# Patient Record
Sex: FEMALE | Race: AMERICAN INDIAN OR ALASKA NATIVE
[De-identification: names, ages, dates, MRNs, and addresses within clinical notes are randomized per-mention and may not be internally consistent; named-entity substitution may affect disease eponyms.]

---

## 2017-04-07 ENCOUNTER — HOSPITAL ENCOUNTER (OUTPATIENT)
Dept: HOSPITAL 5 - CARD | Age: 67
Discharge: HOME | End: 2017-04-07
Payer: MEDICARE

## 2017-04-07 DIAGNOSIS — Z02.89: Primary | ICD-10-CM

## 2017-04-07 DIAGNOSIS — I10: ICD-10-CM

## 2017-04-07 PROCEDURE — 93010 ELECTROCARDIOGRAM REPORT: CPT

## 2017-04-07 PROCEDURE — 93005 ELECTROCARDIOGRAM TRACING: CPT

## 2017-06-28 ENCOUNTER — HOSPITAL ENCOUNTER (OUTPATIENT)
Dept: HOSPITAL 5 - MAMMO | Age: 67
Discharge: HOME | End: 2017-06-28
Payer: OTHER GOVERNMENT

## 2017-06-28 DIAGNOSIS — Z12.31: Primary | ICD-10-CM

## 2017-06-28 PROCEDURE — 77067 SCR MAMMO BI INCL CAD: CPT

## 2017-06-28 PROCEDURE — G0202 SCR MAMMO BI INCL CAD: HCPCS

## 2017-06-28 NOTE — MAMMOGRAPHY REPORT
Bilateral digital screening mammogram with CAD.



Findings: There is heterogeneous density of the fibroglandular tissue. 

In the right breast at the 12:00 position, there is a vague  asymmetry 

which needs further evaluation. No architectural distortion or 

suspicious calcifications.



Impression: Right Parenchymal asymmetry.



BI-RADS code: 0.



Recommendation: Spot compression images, 90 degree view, and ultrasound 

if needed.

## 2017-07-14 ENCOUNTER — HOSPITAL ENCOUNTER (OUTPATIENT)
Dept: HOSPITAL 5 - MAMMO | Age: 67
Discharge: HOME | End: 2017-07-14
Payer: MEDICARE

## 2017-07-14 DIAGNOSIS — R92.8: Primary | ICD-10-CM

## 2017-07-14 NOTE — MAMMOGRAPHY REPORT
Spot compression magnification of this finding density upper inner 

right breast:



Findings:



There is complete effacement noted of the density seen on the MLO view 

and CC view with spot compression and magnification views. No 

microcalcification or masses seen.



Impression:



Benign findings. Annual followup mammogram recommended. BI-RADS 

CATEGORY:  2 = Benign



ACR BI-RADS MAMMOGRAPHIC CODES:

0 = Needs additional imaging evaluation; 1 = Negative; 2 = Benign; 3 = 

Probably benign; 4 = Suspicious; 5 = Malignant; 6 = Known biopsy-proven 

malignancy



COMMENT:

      1.   Dense breast tissue, i.e., adenosis, fibrocystic 

            changes, etc., may obscure an underlying neoplasm.

      2.   Approximately 10% of cancers are not detected with

            mammography.

      3.   A negative mammography report should not delay biopsy 

            if a clinically suspicious mass is present. COMMENT:

Patient follow-up letters are generated in [employer].

## 2018-07-06 ENCOUNTER — HOSPITAL ENCOUNTER (OUTPATIENT)
Dept: HOSPITAL 5 - MAMMO | Age: 68
Discharge: HOME | End: 2018-07-06
Payer: MEDICARE

## 2018-07-06 DIAGNOSIS — Z12.31: Primary | ICD-10-CM

## 2018-07-06 PROCEDURE — 77067 SCR MAMMO BI INCL CAD: CPT

## 2018-07-06 NOTE — MAMMOGRAPHY REPORT
BILATERAL DIGITAL SCREENING MAMMOGRAM with CAD : 07/06/18 11:11:00



CLINICAL: Routine screening.



COMPARISON:06/28/17



FINDINGS: The breasts are heterogeneously dense, which may obscure 

small masses. No mass, architectural distortion or suspicious 

calcifications.



IMPRESSION: No mammographic evidence of malignancy.



BI-RADS CATEGORY:  2 -- Benign



RECOMMENDATION: Routine mammographic screening in one year.



COMMENT:

Patient follow-up letters are generated by our [employer] application.

## 2019-06-25 ENCOUNTER — HOSPITAL ENCOUNTER (EMERGENCY)
Dept: HOSPITAL 5 - ED | Age: 69
Discharge: HOME | End: 2019-06-25
Payer: MEDICARE

## 2019-06-25 VITALS — DIASTOLIC BLOOD PRESSURE: 80 MMHG | SYSTOLIC BLOOD PRESSURE: 164 MMHG

## 2019-06-25 DIAGNOSIS — S81.812A: Primary | ICD-10-CM

## 2019-06-25 DIAGNOSIS — M79.7: ICD-10-CM

## 2019-06-25 DIAGNOSIS — Z79.1: ICD-10-CM
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
## 2019-06-25 PROCEDURE — 90471 IMMUNIZATION ADMIN: CPT

## 2019-06-25 PROCEDURE — A6250 SKIN SEAL PROTECT MOISTURIZR: HCPCS

## 2019-06-25 PROCEDURE — 90715 TDAP VACCINE 7 YRS/> IM: CPT

## 2019-06-25 NOTE — EVENT NOTE
ED Screening Note


ED Screening Note: 





lac lle 


need tdap


This initial assessment/diagnostic orders/clinical plan/treatment(s) is/are 

subject to change based on patients health status, clinical progression and re-

assessment by fellow clinical providers in the ED. Further treatment and workup 

at subsequent clinical providers discretion. Patient/guardian urged not to elope

from the ED as their condition may be serious if not clinically assessed and 

managed. 





Initial orders include:

## 2019-06-25 NOTE — EMERGENCY DEPARTMENT REPORT
- General


Chief Complaint: Wound/Laceration


Stated Complaint: LT LEG LAC


Time Seen by Provider: 06/25/19 14:00


Source: patient


Mode of arrival: Ambulatory


Limitations: No Limitations





- History of Present Illness


Initial Comments: 





Patient is a 69-year-old American female who suffered a laceration prior to 

arrival.  Patient's laceration is on her left leg anteriorly.  Patient was d

umping a mattress at adult and there is a large piece of glass from a 

fluorescent light bulb that cut into her leg.  Patient was able to wrap it to 

try to control bleeding and she came to the emergency department.  Patient is 

not up-to-date with her tetanus.


Context: accidental


Associated Symptoms: pain.  denies: loss of feeling/numbness, suspect foreign 

body present, unable to move injured part





- Related Data


                                  Previous Rx's











 Medication  Instructions  Recorded  Last Taken  Type


 


Clindamycin [Clindamycin CAP] 300 mg PO Q8H #15 cap 06/25/19 Unknown Rx


 


Ibuprofen [Motrin 600 MG tab] 600 mg PO Q8H PRN #20 tablet 06/25/19 Unknown Rx











                                    Allergies











Allergy/AdvReac Type Severity Reaction Status Date / Time


 


No Known Allergies Allergy   Unverified 05/15/14 09:22














ED Review of Systems


ROS: 


Stated complaint: LT LEG LAC


Other details as noted in HPI





Comment: All other systems reviewed and negative





ED Past Medical Hx





- Past Medical History


Previous Medical History?: Yes


Additional medical history: Fibromyalgia





- Surgical History


Past Surgical History?: Yes


Additional Surgical History: TMJ.  Right Rotator cuff





- Medications


Home Medications: 


                                Home Medications











 Medication  Instructions  Recorded  Confirmed  Last Taken  Type


 


Clindamycin [Clindamycin CAP] 300 mg PO Q8H #15 cap 06/25/19  Unknown Rx


 


Ibuprofen [Motrin 600 MG tab] 600 mg PO Q8H PRN #20 tablet 06/25/19  Unknown Rx














ED Physical Exam





- General


Limitations: No Limitations


General appearance: alert, in no apparent distress





- Head


Head exam: Present: atraumatic, normocephalic





- Eye


Eye exam: Present: normal appearance





- ENT


ENT exam: Present: mucous membranes moist





- Neck


Neck exam: Present: normal inspection





- Respiratory


Respiratory exam: Absent: respiratory distress





- Extremities Exam


Extremities exam: Present: normal inspection, other (patient with a linear 3 cm 

laceration to the anterior left shin)





- Back Exam


Back exam: Present: normal inspection





- Neurological Exam


Neurological exam: Present: alert, oriented X3





- Psychiatric


Psychiatric exam: Present: normal affect, normal mood





- Skin


Skin exam: Present: warm, dry, intact, normal color.  Absent: rash





- Laceration /Wound Repair


  ** Left Anterior Leg


Wound Location: lower extremity


Wound Length (cm): 3


Wound's Depth, Shape: linear


Wound Explored: clean


Irrigated w/ Saline (ccs): 100


Betadine Prep?: Yes


Anesthesia: 1% Lidocaine


Volume Anesthetic (ccs): 8


Wound Repaired With: sutures (Staples)


Number of Sutures: 9


Layer Closure?: No


Sterile Dressing Applied?: Yes


Critical care attestation.: 


If time is entered above; I have spent that time in minutes in the direct care 

of this critically ill patient, excluding procedure time.








ED Disposition


Clinical Impression: 


 Laceration





Disposition: DC-01 TO HOME OR SELFCARE


Is pt being admited?: No


Does the pt Need Aspirin: No


Condition: Stable


Instructions:  Suture Care (ED), Laceration (ED)


Additional Instructions: 


Please follow-up with your primary care physician in 7-10 days


Time of Disposition: 16:15

## 2019-10-11 ENCOUNTER — HOSPITAL ENCOUNTER (OUTPATIENT)
Dept: HOSPITAL 5 - MAMMO | Age: 69
Discharge: HOME | End: 2019-10-11
Attending: OBSTETRICS & GYNECOLOGY
Payer: MEDICARE

## 2019-10-11 DIAGNOSIS — Z12.31: Primary | ICD-10-CM

## 2019-10-11 DIAGNOSIS — M81.0: ICD-10-CM

## 2019-10-11 DIAGNOSIS — M85.80: ICD-10-CM

## 2019-10-11 PROCEDURE — 77080 DXA BONE DENSITY AXIAL: CPT

## 2019-10-11 PROCEDURE — 77067 SCR MAMMO BI INCL CAD: CPT

## 2019-10-15 NOTE — MAMMOGRAPHY REPORT
DIGITAL SCREENING MAMMOGRAM WITH CAD, 10/11/2019



INDICATION: Routine screening mammography. 



TECHNIQUE:  Digital bilateral  2D mammography was obtained in the craniocaudal and mediolateral obliq
ue projections. This examination was interpreted with the benefit of Computer-Aided Detection analysi
s.



COMPARISON: 7/6/2018



FINDINGS: 



Breast Density: The breasts are heterogeneously dense, which may obscure small masses.



There is no evidence of dominant mass, suspicious calcifications or architectural distortion in eithe
r breast.



IMPRESSION: No mammographic evidence of malignancy.



Follow up recommendation: Routine yearly



BI-RADS Category 2:  Benign.



A "normal" or negative report should not discourage follow up or biopsy of a clinically significant f
inding.



A written summary of these findings will be mailed to the patient. The patient will be entered into a
 mammography reporting system which will generate a reminder letter for the patient's next appointmen
t at the appropriate interval.



The American College [employer]iology recommends yearly mammograms starting at age 40 and continuing as l
wilber as a woman is in good health.  Breast MRI is recommended for women with an approximate 20-25% or 
greater lifetime risk of breast cancer, including women with a strong family history of breast or ova
martin cancer or who have been treated for Hodgkin's disease.



Signer Name: Og Schuler MD 

Signed: 10/15/2019 4:08 PM

 Workstation Name: [id number]

## 2019-10-15 NOTE — MAMMOGRAPHY REPORT
BONE DEXA



CLINICAL: Family history of osteoporosis. Postmenopausal.



TECHNIQUE: 2 site bone DEXA performed on an Hologic scanner.



FINDINGS:



The average BMD of the lumbar spine L1-L4 is 1.141g/cm squared with a T score of +0.9 and a Z score o
f +2.0. 



The average total BMD of the left hip is 0.978 g/cm squared with a T score of +0.3and a Z score of +0
.7. 





IMPRESSION:

1. WHO classification: Normal with average fracture risk based on spine measurements.

2. WHO classification Normal with average fracture risk based on left hip measurements.



RECOMMENDATION: Clinical correlation and routine screening.



Definitions:

BMD equal bone mineral density

T score = BMD related to peak bone mass of young adult (Rui expressed an standard deviation)

Z score = age-matched BMD expressed in SD

World health organization (WHO) diagnostic criteria

Normal T score greater than equal to 1 standard deviation

Osteopenia T score between -1 and -2.4 standard deviation

Osteoporosis T score -2.5 standard deviation or below.



Note: BMD is not the only risk factor for fracture; also consider factors such as the patient's age, 
risk of falling, previous osteoporotic fracture, family history of osteoporotic fractures, current sm
oker and low body weight.



Z scores are not calculated if greater than 80 years of age.



Signer Name: Og Schuler MD 

Signed: 10/15/2019 4:06 PM

 Workstation Name: [id number]

## 2021-04-22 ENCOUNTER — OFFICE VISIT (OUTPATIENT)
Dept: URBAN - METROPOLITAN AREA CLINIC 118 | Facility: CLINIC | Age: 71
End: 2021-04-22
Payer: MEDICARE

## 2021-04-22 ENCOUNTER — LAB OUTSIDE AN ENCOUNTER (OUTPATIENT)
Dept: URBAN - METROPOLITAN AREA CLINIC 118 | Facility: CLINIC | Age: 71
End: 2021-04-22

## 2021-04-22 DIAGNOSIS — Z12.11 COLON CANCER SCREENING: ICD-10-CM

## 2021-04-22 DIAGNOSIS — R05 CHRONIC COUGH: ICD-10-CM

## 2021-04-22 DIAGNOSIS — K21.00 GASTROESOPHAGEAL REFLUX DISEASE WITH ESOPHAGITIS WITHOUT HEMORRHAGE: ICD-10-CM

## 2021-04-22 PROCEDURE — 99214 OFFICE O/P EST MOD 30 MIN: CPT | Performed by: INTERNAL MEDICINE

## 2021-04-22 NOTE — HPI-TODAY'S VISIT:
The patient was last seen in 2020 for reflux disease.  She returns for reflux and chronic cough, as well as colon cancer screening.  Her last colonoscopy was in 2017 and multiple large adenomatous polyps were removed; she is due for surveillance this year.  There is no family history of colon cancer or colon polyps and she has no nausea, vomiting, change in her bowel habits, or blood in her stools.  She does have a chronic cough with hoarseness and has been diagnosed with acid reflux many years ago by and ENT physician.  She tried ranitidine and it did not help her symptoms very much.  She does have associated belching and a feeling of air trapped beneath the sternum.  At an upper endoscopy in 2018 an esophageal stricture was noted with a small hiatal hernia.  She currently denies severe dysphagia, and there is no odynophagia, hematemesis, or melena.  She is not currently on any antacid therapy, but says that she has minimal substernal burning.  She will take an occasional over-the-counter medication as needed, but would prefer not to take a daily medication for acid reflux if she does not need it.

## 2021-04-24 PROBLEM — 1027000 ALKALINE REFLUX ESOPHAGITIS: Status: ACTIVE | Noted: 2021-04-24

## 2021-05-05 ENCOUNTER — OFFICE VISIT (OUTPATIENT)
Dept: URBAN - METROPOLITAN AREA CLINIC 109 | Facility: CLINIC | Age: 71
End: 2021-05-05

## 2021-05-28 ENCOUNTER — OFFICE VISIT (OUTPATIENT)
Dept: URBAN - METROPOLITAN AREA SURGERY CENTER 23 | Facility: SURGERY CENTER | Age: 71
End: 2021-05-28
Payer: MEDICARE

## 2021-05-28 DIAGNOSIS — Z86.010 H/O ADENOMATOUS POLYP OF COLON: ICD-10-CM

## 2021-05-28 PROCEDURE — G0105 COLORECTAL SCRN; HI RISK IND: HCPCS | Performed by: INTERNAL MEDICINE

## 2021-05-28 PROCEDURE — G8907 PT DOC NO EVENTS ON DISCHARG: HCPCS | Performed by: INTERNAL MEDICINE

## 2021-09-27 ENCOUNTER — OFFICE VISIT (OUTPATIENT)
Dept: URBAN - METROPOLITAN AREA CLINIC 118 | Facility: CLINIC | Age: 71
End: 2021-09-27
Payer: MEDICARE

## 2021-09-27 ENCOUNTER — LAB OUTSIDE AN ENCOUNTER (OUTPATIENT)
Dept: URBAN - METROPOLITAN AREA CLINIC 118 | Facility: CLINIC | Age: 71
End: 2021-09-27

## 2021-09-27 DIAGNOSIS — K59.04 CHRONIC IDIOPATHIC CONSTIPATION: ICD-10-CM

## 2021-09-27 DIAGNOSIS — K21.00 GASTROESOPHAGEAL REFLUX DISEASE WITH ESOPHAGITIS WITHOUT HEMORRHAGE: ICD-10-CM

## 2021-09-27 DIAGNOSIS — R93.3 ABNORMAL UPPER GASTROINTESTINAL BARIUM SERIES: ICD-10-CM

## 2021-09-27 PROCEDURE — 99214 OFFICE O/P EST MOD 30 MIN: CPT | Performed by: INTERNAL MEDICINE

## 2021-09-27 RX ORDER — PANTOPRAZOLE SODIUM 40 MG/1
1 TABLET TABLET, DELAYED RELEASE ORAL ONCE A DAY
Qty: 30 TABLET | Refills: 5 | OUTPATIENT
Start: 2021-09-27

## 2021-09-27 RX ORDER — AMLODIPINE BESYLATE 5 MG/1
1 TABLET TABLET ORAL ONCE A DAY
Status: ACTIVE | COMMUNITY

## 2021-09-27 RX ORDER — FAMOTIDINE 40 MG/1
1 TABLET AT BEDTIME TABLET, FILM COATED ORAL ONCE A DAY
Qty: 30 TABLET | Refills: 5 | OUTPATIENT
Start: 2021-09-27

## 2021-09-27 NOTE — HPI-TODAY'S VISIT:
The patient is following up after her recent colonoscopy as well as upper GI series.  The colonoscopy was within normal limits and she is not due for surveillance for 5 years, due to a personal history of colon polyps.  Her barium swallow/upper GI series however showed significant reflux to the thoracic inlet.  She is taking omeprazole 40 mg twice daily therapy but continues to have regurgitation, burping, and substernal burning both day and nighttime.  The symptoms are worse during the day and are often worse postprandial.  She has excessive burping and gas production.  There is no hematemesis, dysphagia, odynophagia or melena.  She has lost 8 pounds but this is on a lower carbohydrate diet and attempt to improve her health.  Her chronic idiopathic constipation is only minimally controlled with daily MiraLAX and stool softeners but she does have a small bowel movement every day.  However she describes bloating and incomplete evacuation and a dissatisfaction with the MiraLAX.

## 2021-10-01 ENCOUNTER — CLAIMS CREATED FROM THE CLAIM WINDOW (OUTPATIENT)
Dept: URBAN - METROPOLITAN AREA CLINIC 4 | Facility: CLINIC | Age: 71
End: 2021-10-01
Payer: MEDICARE

## 2021-10-01 ENCOUNTER — OFFICE VISIT (OUTPATIENT)
Dept: URBAN - METROPOLITAN AREA SURGERY CENTER 23 | Facility: SURGERY CENTER | Age: 71
End: 2021-10-01
Payer: MEDICARE

## 2021-10-01 DIAGNOSIS — K21.9 GASTRO-ESOPHAGEAL REFLUX DISEASE WITHOUT ESOPHAGITIS: ICD-10-CM

## 2021-10-01 DIAGNOSIS — K31.89 NONSURGICAL DUMPING SYNDROME: ICD-10-CM

## 2021-10-01 DIAGNOSIS — K21.9 ACID REFLUX: ICD-10-CM

## 2021-10-01 DIAGNOSIS — K31.89 ACQUIRED DEFORMITY OF DUODENUM: ICD-10-CM

## 2021-10-01 PROCEDURE — 88312 SPECIAL STAINS GROUP 1: CPT | Performed by: PATHOLOGY

## 2021-10-01 PROCEDURE — 88305 TISSUE EXAM BY PATHOLOGIST: CPT | Performed by: PATHOLOGY

## 2021-10-01 PROCEDURE — G8907 PT DOC NO EVENTS ON DISCHARG: HCPCS | Performed by: INTERNAL MEDICINE

## 2021-10-01 PROCEDURE — 43239 EGD BIOPSY SINGLE/MULTIPLE: CPT | Performed by: INTERNAL MEDICINE

## 2021-10-01 RX ORDER — FAMOTIDINE 40 MG/1
1 TABLET AT BEDTIME TABLET, FILM COATED ORAL ONCE A DAY
Qty: 30 TABLET | Refills: 5 | Status: ACTIVE | COMMUNITY
Start: 2021-09-27

## 2021-10-01 RX ORDER — PANTOPRAZOLE SODIUM 40 MG/1
1 TABLET TABLET, DELAYED RELEASE ORAL ONCE A DAY
Qty: 30 TABLET | Refills: 5 | Status: ACTIVE | COMMUNITY
Start: 2021-09-27

## 2021-10-01 RX ORDER — AMLODIPINE BESYLATE 5 MG/1
1 TABLET TABLET ORAL ONCE A DAY
Status: ACTIVE | COMMUNITY

## 2021-12-17 ENCOUNTER — OFFICE VISIT (OUTPATIENT)
Dept: URBAN - METROPOLITAN AREA CLINIC 109 | Facility: CLINIC | Age: 71
End: 2021-12-17
Payer: MEDICARE

## 2021-12-17 DIAGNOSIS — R14.0 BLOATING: ICD-10-CM

## 2021-12-17 DIAGNOSIS — K21.00 GASTROESOPHAGEAL REFLUX DISEASE WITH ESOPHAGITIS WITHOUT HEMORRHAGE: ICD-10-CM

## 2021-12-17 DIAGNOSIS — R14.2 BELCHING: ICD-10-CM

## 2021-12-17 DIAGNOSIS — K59.04 CHRONIC IDIOPATHIC CONSTIPATION: ICD-10-CM

## 2021-12-17 PROCEDURE — 99214 OFFICE O/P EST MOD 30 MIN: CPT | Performed by: INTERNAL MEDICINE

## 2021-12-17 RX ORDER — FAMOTIDINE 40 MG/1
1 TABLET AT BEDTIME TABLET, FILM COATED ORAL ONCE A DAY
Qty: 30 TABLET | Refills: 5 | Status: ON HOLD | COMMUNITY
Start: 2021-09-27

## 2021-12-17 RX ORDER — PANTOPRAZOLE SODIUM 40 MG/1
1 TABLET TABLET, DELAYED RELEASE ORAL ONCE A DAY
Qty: 30 TABLET | Refills: 5 | Status: ON HOLD | COMMUNITY
Start: 2021-09-27

## 2021-12-17 RX ORDER — AMLODIPINE BESYLATE 5 MG/1
1 TABLET TABLET ORAL ONCE A DAY
Status: ACTIVE | COMMUNITY

## 2021-12-17 RX ORDER — LINACLOTIDE 145 UG/1
1 CAPSULE AT LEAST 30 MINUTES BEFORE THE FIRST MEAL OF THE DAY ON AN EMPTY STOMACH CAPSULE, GELATIN COATED ORAL ONCE A DAY
Qty: 90 | Refills: 3 | OUTPATIENT
Start: 2021-12-17 | End: 2022-12-12

## 2021-12-17 NOTE — HPI-TODAY'S VISIT:
patient of Dr Barrera, see his notes, and recent EGD/colon neg  she reports left flank pain, worse with exercise and standing for a while also has lots of belching still with chronic constipation despite the constant miralax, and nightly milk of magnesia

## 2022-01-03 ENCOUNTER — TELEPHONE ENCOUNTER (OUTPATIENT)
Dept: URBAN - METROPOLITAN AREA CLINIC 118 | Facility: CLINIC | Age: 72
End: 2022-01-03

## 2022-01-03 RX ORDER — LINACLOTIDE 145 UG/1
1 CAPSULE AT LEAST 30 MINUTES BEFORE THE FIRST MEAL OF THE DAY ON AN EMPTY STOMACH CAPSULE, GELATIN COATED ORAL ONCE A DAY
Qty: 90 | Refills: 3
Start: 2021-12-17 | End: 2022-12-29

## 2023-02-10 ENCOUNTER — TELEPHONE ENCOUNTER (OUTPATIENT)
Dept: URBAN - METROPOLITAN AREA CLINIC 52 | Facility: CLINIC | Age: 73
End: 2023-02-10

## 2023-02-14 ENCOUNTER — LAB OUTSIDE AN ENCOUNTER (OUTPATIENT)
Dept: URBAN - METROPOLITAN AREA CLINIC 52 | Facility: CLINIC | Age: 73
End: 2023-02-14

## 2023-02-14 ENCOUNTER — OFFICE VISIT (OUTPATIENT)
Dept: URBAN - METROPOLITAN AREA CLINIC 52 | Facility: CLINIC | Age: 73
End: 2023-02-14
Payer: MEDICARE

## 2023-02-14 VITALS
WEIGHT: 146.6 LBS | SYSTOLIC BLOOD PRESSURE: 153 MMHG | HEIGHT: 66 IN | BODY MASS INDEX: 23.56 KG/M2 | DIASTOLIC BLOOD PRESSURE: 80 MMHG | HEART RATE: 88 BPM | TEMPERATURE: 98.1 F

## 2023-02-14 DIAGNOSIS — K21.00 GASTROESOPHAGEAL REFLUX DISEASE WITH ESOPHAGITIS WITHOUT HEMORRHAGE: ICD-10-CM

## 2023-02-14 DIAGNOSIS — M54.50 LOW BACK PAIN, UNSPECIFIED: ICD-10-CM

## 2023-02-14 DIAGNOSIS — K59.04 CHRONIC IDIOPATHIC CONSTIPATION: ICD-10-CM

## 2023-02-14 DIAGNOSIS — R93.3 ABNORMAL UPPER GASTROINTESTINAL BARIUM SERIES: ICD-10-CM

## 2023-02-14 DIAGNOSIS — G89.29 OTHER CHRONIC PAIN: ICD-10-CM

## 2023-02-14 DIAGNOSIS — R93.5 ABNORMAL CT OF THE ABDOMEN: ICD-10-CM

## 2023-02-14 PROBLEM — 82934008: Status: ACTIVE | Noted: 2021-09-27

## 2023-02-14 PROBLEM — 266433003: Status: ACTIVE | Noted: 2021-09-27

## 2023-02-14 PROBLEM — 82423001: Status: ACTIVE | Noted: 2023-02-14

## 2023-02-14 PROCEDURE — 99213 OFFICE O/P EST LOW 20 MIN: CPT | Performed by: INTERNAL MEDICINE

## 2023-02-14 RX ORDER — MEDROXYPROGESTERONE ACETATE 5 MG/1
2 TABLETS WITH FOOD TABLET ORAL ONCE A DAY
Status: ACTIVE | COMMUNITY

## 2023-02-14 RX ORDER — PANTOPRAZOLE SODIUM 40 MG/1
1 TABLET TABLET, DELAYED RELEASE ORAL ONCE A DAY
Qty: 30 TABLET | Refills: 5 | COMMUNITY
Start: 2021-09-27

## 2023-02-14 RX ORDER — AMLODIPINE BESYLATE 5 MG/1
1 TABLET TABLET ORAL ONCE A DAY
Status: ACTIVE | COMMUNITY

## 2023-02-14 RX ORDER — SIMVASTATIN 20 MG
1 TABLET IN THE EVENING TABLET ORAL ONCE A DAY
Status: ACTIVE | COMMUNITY

## 2023-02-14 RX ORDER — FAMOTIDINE 40 MG/1
1 TABLET AT BEDTIME TABLET, FILM COATED ORAL ONCE A DAY
Qty: 30 TABLET | Refills: 5 | COMMUNITY
Start: 2021-09-27

## 2023-02-14 NOTE — HPI-TODAY'S VISIT:
Occ HB. PPI does not help. Pain L lower back. Had CT scan 1/23/23-Dilated stomach with food. Ut fibroids. *She was allowed food intake before the CT. She ate 1 hr before 10/9671-NWM-shxfg change, bulging disc. 10/2021-EGD-NL x mild erythema E + G. BXs-NS inflammation [id number]-RG x IH. 5/2021-UGI-reflux. No stricture. Ext compression in neck spurs.

## 2023-02-15 ENCOUNTER — CLAIMS CREATED FROM THE CLAIM WINDOW (OUTPATIENT)
Dept: URBAN - METROPOLITAN AREA SURGERY CENTER 17 | Facility: SURGERY CENTER | Age: 73
End: 2023-02-15

## 2023-02-15 ENCOUNTER — CLAIMS CREATED FROM THE CLAIM WINDOW (OUTPATIENT)
Dept: URBAN - METROPOLITAN AREA CLINIC 4 | Facility: CLINIC | Age: 73
End: 2023-02-15
Payer: MEDICARE

## 2023-02-15 ENCOUNTER — CLAIMS CREATED FROM THE CLAIM WINDOW (OUTPATIENT)
Dept: URBAN - METROPOLITAN AREA SURGERY CENTER 17 | Facility: SURGERY CENTER | Age: 73
End: 2023-02-15
Payer: MEDICARE

## 2023-02-15 DIAGNOSIS — K29.70 GASTRITIS, UNSPECIFIED, WITHOUT BLEEDING: ICD-10-CM

## 2023-02-15 DIAGNOSIS — K29.60 EROSIVE GASTRITIS: ICD-10-CM

## 2023-02-15 DIAGNOSIS — R12 BURNING REFLUX: ICD-10-CM

## 2023-02-15 DIAGNOSIS — R93.3 ABN FINDINGS-GI TRACT: ICD-10-CM

## 2023-02-15 PROBLEM — 1086791000119100: Status: ACTIVE | Noted: 2023-02-15

## 2023-02-15 PROCEDURE — 43239 EGD BIOPSY SINGLE/MULTIPLE: CPT | Performed by: INTERNAL MEDICINE

## 2023-02-15 PROCEDURE — 88305 TISSUE EXAM BY PATHOLOGIST: CPT | Performed by: PATHOLOGY

## 2023-02-15 PROCEDURE — G8907 PT DOC NO EVENTS ON DISCHARG: HCPCS | Performed by: INTERNAL MEDICINE

## 2023-02-15 PROCEDURE — 88312 SPECIAL STAINS GROUP 1: CPT | Performed by: PATHOLOGY

## 2023-02-15 RX ORDER — SIMVASTATIN 20 MG
1 TABLET IN THE EVENING TABLET ORAL ONCE A DAY
Status: ACTIVE | COMMUNITY

## 2023-02-15 RX ORDER — PANTOPRAZOLE SODIUM 40 MG/1
1 TABLET TABLET, DELAYED RELEASE ORAL ONCE A DAY
Qty: 30 TABLET | Refills: 5 | COMMUNITY
Start: 2021-09-27

## 2023-02-15 RX ORDER — FAMOTIDINE 40 MG/1
1 TABLET AT BEDTIME TABLET, FILM COATED ORAL ONCE A DAY
Qty: 30 TABLET | Refills: 5 | COMMUNITY
Start: 2021-09-27

## 2023-02-15 RX ORDER — SUCRALFATE 1 G/1
1 TABLET BEFORE MEALS + AT BEDTIME TABLET ORAL
Qty: 120 TABLET | Refills: 11 | OUTPATIENT
Start: 2023-02-15 | End: 2024-02-10

## 2023-02-15 RX ORDER — MEDROXYPROGESTERONE ACETATE 5 MG/1
2 TABLETS WITH FOOD TABLET ORAL ONCE A DAY
Status: ACTIVE | COMMUNITY

## 2023-02-15 RX ORDER — AMLODIPINE BESYLATE 5 MG/1
1 TABLET TABLET ORAL ONCE A DAY
Status: ACTIVE | COMMUNITY

## 2023-02-16 ENCOUNTER — TELEPHONE ENCOUNTER (OUTPATIENT)
Dept: URBAN - METROPOLITAN AREA CLINIC 52 | Facility: CLINIC | Age: 73
End: 2023-02-16

## 2023-03-30 ENCOUNTER — DASHBOARD ENCOUNTERS (OUTPATIENT)
Age: 73
End: 2023-03-30

## 2023-03-30 ENCOUNTER — OFFICE VISIT (OUTPATIENT)
Dept: URBAN - METROPOLITAN AREA CLINIC 52 | Facility: CLINIC | Age: 73
End: 2023-03-30
Payer: MEDICARE

## 2023-03-30 VITALS
TEMPERATURE: 97.9 F | BODY MASS INDEX: 23.95 KG/M2 | SYSTOLIC BLOOD PRESSURE: 99 MMHG | HEIGHT: 66 IN | OXYGEN SATURATION: 97 % | WEIGHT: 149 LBS | HEART RATE: 81 BPM | DIASTOLIC BLOOD PRESSURE: 64 MMHG

## 2023-03-30 DIAGNOSIS — R93.5 ABNORMAL CT OF THE ABDOMEN: ICD-10-CM

## 2023-03-30 DIAGNOSIS — K29.60 EROSIVE GASTRITIS: ICD-10-CM

## 2023-03-30 DIAGNOSIS — M54.50 LOW BACK PAIN, UNSPECIFIED: ICD-10-CM

## 2023-03-30 PROCEDURE — 99213 OFFICE O/P EST LOW 20 MIN: CPT | Performed by: INTERNAL MEDICINE

## 2023-03-30 RX ORDER — FAMOTIDINE 40 MG/1
1 TABLET AT BEDTIME TABLET, FILM COATED ORAL ONCE A DAY
Qty: 30 TABLET | Refills: 5 | COMMUNITY
Start: 2021-09-27

## 2023-03-30 RX ORDER — MEDROXYPROGESTERONE ACETATE 5 MG/1
2 TABLETS WITH FOOD TABLET ORAL ONCE A DAY
Status: ACTIVE | COMMUNITY

## 2023-03-30 RX ORDER — SUCRALFATE 1 G/1
1 TABLET BEFORE MEALS + AT BEDTIME TABLET ORAL
Qty: 120 TABLET | Refills: 11 | Status: ACTIVE | COMMUNITY
Start: 2023-02-15 | End: 2024-02-10

## 2023-03-30 RX ORDER — PANTOPRAZOLE SODIUM 40 MG/1
1 TABLET TABLET, DELAYED RELEASE ORAL ONCE A DAY
Qty: 30 TABLET | Refills: 5 | COMMUNITY
Start: 2021-09-27

## 2023-03-30 RX ORDER — AMLODIPINE BESYLATE 5 MG/1
1 TABLET TABLET ORAL ONCE A DAY
Status: ACTIVE | COMMUNITY

## 2023-03-30 RX ORDER — SIMVASTATIN 20 MG
1 TABLET IN THE EVENING TABLET ORAL ONCE A DAY
Status: ACTIVE | COMMUNITY

## 2023-03-30 NOTE — HPI-TODAY'S VISIT:
Occ HB. PPI does not help. Pain L lower back. Had CT scan 1/23/23-Dilated stomach with food. Ut fibroids. *She was allowed food intake before the CT. She ate 1 hr before 10/5593-BER-xlvwi change, bulging disc, spinal stenosis. 10/2021-EGD-NL x mild erythema E + G. BXs-NS inflammation [id number] x IH. 5/2021-UGI-reflux. No stricture. Ext compression in neck spurs. 2/2023-EGD-small HH. Min change G. BX of G-CG

## 2023-09-12 ENCOUNTER — APPOINTMENT (RX ONLY)
Dept: URBAN - METROPOLITAN AREA CLINIC 33 | Facility: CLINIC | Age: 73
Setting detail: DERMATOLOGY
End: 2023-09-12

## 2023-09-12 ENCOUNTER — RX ONLY (OUTPATIENT)
Age: 73
Setting detail: RX ONLY
End: 2023-09-12

## 2023-09-12 DIAGNOSIS — L90.8 OTHER ATROPHIC DISORDERS OF SKIN: ICD-10-CM

## 2023-09-12 PROCEDURE — 99202 OFFICE O/P NEW SF 15 MIN: CPT

## 2023-09-12 PROCEDURE — ? COUNSELING

## 2023-09-12 PROCEDURE — ? ADDITIONAL NOTES

## 2023-09-12 RX ORDER — LIDOCAINE AND PRILOCAINE 25; 25 MG/G; MG/G
CREAM TOPICAL
Qty: 30 | Refills: 1 | Status: ERX | COMMUNITY
Start: 2023-09-12

## 2023-09-12 ASSESSMENT — LOCATION DETAILED DESCRIPTION DERM
LOCATION DETAILED: RIGHT INFERIOR CENTRAL MALAR CHEEK
LOCATION DETAILED: LEFT INFERIOR MEDIAL MALAR CHEEK

## 2023-09-12 ASSESSMENT — LOCATION SIMPLE DESCRIPTION DERM
LOCATION SIMPLE: LEFT CHEEK
LOCATION SIMPLE: RIGHT CHEEK

## 2023-09-12 ASSESSMENT — LOCATION ZONE DERM: LOCATION ZONE: FACE

## 2023-09-12 NOTE — HPI: FACE (AGING FACE)
How Severe Is It?: mild
Is This A New Presentation, Or A Follow-Up?: Aging Face
Additional History: Patient would like to discuss a skin care regimen.

## 2023-09-12 NOTE — PROCEDURE: ADDITIONAL NOTES
Additional Notes: Discussed with patient that we could start with micro needling and  Botox.\\nPatient states that she will start with micro needling
Render Risk Assessment In Note?: no
Detail Level: Simple